# Patient Record
Sex: FEMALE | Race: WHITE | URBAN - METROPOLITAN AREA
[De-identification: names, ages, dates, MRNs, and addresses within clinical notes are randomized per-mention and may not be internally consistent; named-entity substitution may affect disease eponyms.]

---

## 2018-08-14 ENCOUNTER — OFFICE VISIT (OUTPATIENT)
Dept: FAMILY MEDICINE CLINIC | Facility: CLINIC | Age: 28
End: 2018-08-14
Payer: COMMERCIAL

## 2018-08-14 VITALS
SYSTOLIC BLOOD PRESSURE: 108 MMHG | RESPIRATION RATE: 16 BRPM | HEART RATE: 67 BPM | DIASTOLIC BLOOD PRESSURE: 64 MMHG | OXYGEN SATURATION: 99 % | WEIGHT: 160 LBS | TEMPERATURE: 98 F | BODY MASS INDEX: 27.31 KG/M2 | HEIGHT: 64 IN

## 2018-08-14 DIAGNOSIS — J00 ACUTE NASOPHARYNGITIS: Primary | ICD-10-CM

## 2018-08-14 LAB
CONTROL LINE PRESENT WITH A CLEAR BACKGROUND (YES/NO): YES YES/NO
STREP GRP A CUL-SCR: NEGATIVE

## 2018-08-14 PROCEDURE — 87880 STREP A ASSAY W/OPTIC: CPT | Performed by: NURSE PRACTITIONER

## 2018-08-14 PROCEDURE — 99202 OFFICE O/P NEW SF 15 MIN: CPT | Performed by: NURSE PRACTITIONER

## 2018-08-14 RX ORDER — MONTELUKAST SODIUM 10 MG/1
TABLET ORAL
COMMUNITY
Start: 2018-07-21

## 2018-08-14 NOTE — PROGRESS NOTES
CHIEF COMPLAINT:   Patient presents with:  Sore Throat: fatigued, possible fever, nausea x 1 dy         HPI:   Helon Canavan is a 24year old female presents to clinic with complaint of sore throat. Patient has had for 1 day.    Patient reports follo LUNGS: clear to auscultation bilaterally; no wheezes, rales, or rhonchi. Breathing is non labored.   CARDIO: RRR without murmur  GI: good BS's,no masses, hepatosplenomegaly, or tenderness on direct palpation  EXTREMITIES: no cyanosis, clubbing or edema  LYM · Relax, lie down. Go to bed if you want. Just get off your feet and rest. Also, drink plenty of fluids to avoid dehydration. · Take acetaminophen or a nonsteroidal anti-inflammatory agent (NSAID), such as ibuprofen.   Treat a troubled nose kindly  · Breat · Signs of dehydration, including extreme thirst, dark urine, infrequent urination, dry mouth  · Spotted, red, or very sore throat   Date Last Reviewed: 12/1/2016  © 9517-6314 [employer]. [address] [facility] [place], [address] [place] [address].  All rights

## 2018-08-14 NOTE — PATIENT INSTRUCTIONS
Rest and fluids  Ibuprofen as packet insert for discomfort  Soups and finger foods  If not improving follow-up with PCP. Adult Self-Care for Colds    Colds are caused by viruses. They can't be cured with antibiotics.  However, you can ease symptoms an · As your appetite returns, you can resume your normal diet. Ask your healthcare provider if there are any foods you should avoid.   When to seek medical care  When you first notice symptoms, ask your healthcare provider if antiviral medicines are appropria

## 2019-12-26 ENCOUNTER — OFFICE VISIT (OUTPATIENT)
Dept: FAMILY MEDICINE CLINIC | Facility: CLINIC | Age: 29
End: 2019-12-26
Payer: COMMERCIAL

## 2019-12-26 VITALS
TEMPERATURE: 98 F | HEIGHT: 64 IN | DIASTOLIC BLOOD PRESSURE: 68 MMHG | BODY MASS INDEX: 29.88 KG/M2 | RESPIRATION RATE: 18 BRPM | SYSTOLIC BLOOD PRESSURE: 122 MMHG | WEIGHT: 175 LBS | HEART RATE: 68 BPM | OXYGEN SATURATION: 99 %

## 2019-12-26 DIAGNOSIS — J30.2 SEASONAL ALLERGIES: Primary | ICD-10-CM

## 2019-12-26 PROCEDURE — 99213 OFFICE O/P EST LOW 20 MIN: CPT | Performed by: NURSE PRACTITIONER

## 2019-12-26 RX ORDER — MONTELUKAST SODIUM 10 MG/1
10 TABLET ORAL NIGHTLY
Qty: 30 TABLET | Refills: 2 | Status: SHIPPED | OUTPATIENT
Start: 2019-12-26

## 2019-12-26 RX ORDER — CLONAZEPAM 0.5 MG/1
0.5 TABLET ORAL
COMMUNITY
Start: 2019-06-19 | End: 2020-08-11

## 2019-12-26 NOTE — PATIENT INSTRUCTIONS
Causes and Effects of Stress    Anything that brings on feelings of stress is called a stressor. Today, we often face many stressors. Read on to find out how stress affects you and how you can gain control.   Your body’s response to stress  When you’re If you do not believe you are successfully managing the stressors in your life, get help from your healthcare provider or a mental health professional. Christina Hines are many effective strategies that can help you adjust your environment and get your stress level · Check stored food for spoilage and mold growth. Clean up spills right away. · Don't let wet clothing sit and grow mold. And don't hang clothes outside to dry where they can collect airborne pollen.  Dry clothing right away in a clothes dryer that's vente · Enclose mattresses, box springs, and pillows in allergy-proof casings. Bedding (sheets, mattress covers, pillowcases) should be washed weekly in hot water. Use washable blankets and quilts. Don't use feather pillows, down comforters, and wool blankets. © 1052-6383 [employer]. [address] [facility] [place], [facility] [place]. All rights reserved. This information is not intended as a substitute for professional medical care. Always follow your healthcare professional's instructions.

## 2019-12-26 NOTE — PROGRESS NOTES
CHIEF COMPLAINT:   Patient presents with:  Cough: worse at night        HPI:     The patient complains of allergy symptoms. Has had for 2 weeks.  Symptoms include: nasal congestion,clear rhinorrhea,nasal itching, sneezing, plugged ears, itchy eyes, increase NOSE- normal external nose. Nasal discharge- clear mucoid. Nasal mucosa- swollen, pale , edematous.  No polyps, septum normal.  NECK: supple, no adenopathy  LUNGS: clear to auscultation  CARDIO: RRR without murmur      ASSESSMENT AND PLAN:   [employer] · Being overwhelmed by technology such as, keeping up with cell phone messages, e-mails, and text messages   The long-term effects of stress  If you’re often under stress, you need to learn to manage it well. Stress can affect your well-being.  Over time, y